# Patient Record
Sex: FEMALE | Race: WHITE | NOT HISPANIC OR LATINO
[De-identification: names, ages, dates, MRNs, and addresses within clinical notes are randomized per-mention and may not be internally consistent; named-entity substitution may affect disease eponyms.]

---

## 2024-08-07 PROBLEM — Z78.9 NEVER SMOKED TOBACCO: Status: ACTIVE | Noted: 2024-08-07

## 2024-08-07 PROBLEM — Z78.9 NEVER SMOKED CIGARETTES: Status: ACTIVE | Noted: 2024-08-07

## 2024-08-07 PROBLEM — Z86.39 HISTORY OF HYPOTHYROIDISM: Status: RESOLVED | Noted: 2024-08-07 | Resolved: 2024-08-07

## 2024-08-07 PROBLEM — D32.9 MENINGIOMA: Status: ACTIVE | Noted: 2024-08-07

## 2024-08-07 PROBLEM — R93.89 ABNORMAL RADIOGRAPHIC EXAMINATION: Status: ACTIVE | Noted: 2024-08-07

## 2024-08-07 PROBLEM — H54.62 VISION LOSS, LEFT EYE: Status: ACTIVE | Noted: 2024-08-07

## 2024-08-07 PROBLEM — Z00.00 ENCOUNTER FOR PREVENTIVE HEALTH EXAMINATION: Status: ACTIVE | Noted: 2024-08-07

## 2024-08-07 PROBLEM — Z78.9 NEVER SMOKED ANY SUBSTANCE: Status: ACTIVE | Noted: 2024-08-07

## 2024-08-08 ENCOUNTER — APPOINTMENT (OUTPATIENT)
Dept: NEUROSURGERY | Facility: CLINIC | Age: 74
End: 2024-08-08

## 2024-08-08 PROCEDURE — 99203 OFFICE O/P NEW LOW 30 MIN: CPT

## 2024-08-08 NOTE — PHYSICAL EXAM
[General Appearance - Alert] : alert [General Appearance - In No Acute Distress] : in no acute distress [General Appearance - Well Nourished] : well nourished [General Appearance - Well Developed] : well developed [General Appearance - Well-Appearing] : healthy appearing [] : normal voice and communication [Oriented To Time, Place, And Person] : oriented to person, place, and time [Affect] : the affect was normal [Mood] : the mood was normal [Memory Recent] : recent memory was not impaired [Memory Remote] : remote memory was not impaired [Person] : oriented to person [Place] : oriented to place [Time] : oriented to time [Short Term Intact] : short term memory intact [Remote Intact] : remote memory intact [Registration Intact] : recent registration memory intact [Span Intact] : the attention span was normal [Concentration Intact] : normal concentrating ability [Fluency] : fluency intact [Comprehension] : comprehension intact [Reading] : reading intact [Current Events] : adequate knowledge of current events [Past History] : adequate knowledge of personal past history [Vocabulary] : adequate range of vocabulary [Cranial Nerves Oculomotor (III)] : extraocular motion intact [Cranial Nerves Trigeminal (V)] : facial sensation intact symmetrically [Cranial Nerves Facial (VII)] : face symmetrical [Cranial Nerves Vestibulocochlear (VIII)] : hearing was intact bilaterally [Cranial Nerves Glossopharyngeal (IX)] : tongue and palate midline [Cranial Nerves Accessory (XI - Cranial And Spinal)] : head turning and shoulder shrug symmetric [Cranial Nerves Hypoglossal (XII)] : there was no tongue deviation with protrusion [Motor Tone] : muscle tone was normal in all four extremities [Motor Strength] : muscle strength was normal in all four extremities [Involuntary Movements] : no involuntary movements were seen [No Muscle Atrophy] : normal bulk in all four extremities [Motor Handedness Right-Handed] : the patient is right hand dominant [5] : S1 toe walking 5/5 [Sensation Tactile Decrease] : light touch was intact [Abnormal Walk] : normal gait [Balance] : balance was intact [PERRL With Normal Accommodation] : pupils were equal in size, round, reactive to light, with normal accommodation [Extraocular Movements] : extraocular movements were intact [Visual Intact] : visual attention was ~T ~L decreased [Limited Balance] : balance was intact [Past-pointing] : there was no past-pointing [Tremor] : no tremor present [Dysdiadochokinesia Bilaterally] : not present [Coordination - Dysmetria Impaired Finger-to-Nose Bilateral] : not present [FreeTextEntry5] : decreased left central field vision [FreeTextEntry1] : visual field full bilat but decreased vision left central vision

## 2024-08-08 NOTE — ASSESSMENT
[FreeTextEntry1] : I, Dr. Connell, personally performed the evaluation and management (E/M) services for this new patient who presents today with a new problem. That E/M includes conducting the examination, assessing all new/exacerbated conditions, and establishing a new plan of care. Today, my ACP, Shy Castro, was here to observe my evaluation and management services for this new problem/exacerbated condition to be followed going forward.  PLAN: - PST provided, CHG provided both instructions reviewed. Would need Cardiology clearance - Angiogram pre-op day before recommended prior to surgery to understand venous anatomy possible need for embolization - Surgical resection recommended and all questions answered - Ok to stay on Aspirin up until surgery  PCP; Dr. Lizzie Perry, [facility] in [place] [phone] Cardiologist: Dr. Meño Marin [phone]

## 2024-08-08 NOTE — HISTORY OF PRESENT ILLNESS
[< 3 months] : less than 3 months [de-identified] : The patient is a 73 year female.  On 7/21 pt was playing pickleball and noticed a significant decrease central vision to her left eye. 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 She went to her local ED in [place] and had a full stroke w/u/ That work-up revealed a large right occipital meningioma. She had a cardiac w/u including ALEKS, loops recording, EKG, ESD, d-dimer labs all WNL. She underwent a temporal artery biopsy which was negative. She is on asa and lipitor now. She presents today to have the meningioma reviewed and discussed.

## 2024-08-08 NOTE — ASSESSMENT
[FreeTextEntry1] : I, Dr. Connell, personally performed the evaluation and management (E/M) services for this new patient who presents today with a new problem. That E/M includes conducting the examination, assessing all new/exacerbated conditions, and establishing a new plan of care. Today, my ACP, Shy Castro, was here to observe my evaluation and management services for this new problem/exacerbated condition to be followed going forward.  PLAN: - PST provided, CHG provided both instructions reviewed. Would need Cardiology clearance - Angiogram pre-op day before recommended prior to surgery to understand venous anatomy possible need for embolization - Surgical resection recommended and all questions answered - Ok to stay on Aspirin up until surgery  PCP; Dr. Lizzie Perry, [facility] in [place] [phone] Cardiologist: Dr. Meño Marin [phone]

## 2024-08-08 NOTE — HISTORY OF PRESENT ILLNESS
[< 3 months] : less than 3 months [de-identified] : The patient is a 73 year female.  On 7/21 pt was playing pickleball and noticed a significant decrease central vision to her left eye. 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 She went to her local ED in [place] and had a full stroke w/u/ That work-up revealed a large right occipital meningioma. She had a cardiac w/u including ALEKS, loops recording, EKG, ESD, d-dimer labs all WNL. She underwent a temporal artery biopsy which was negative. She is on asa and lipitor now. She presents today to have the meningioma reviewed and discussed.

## 2024-08-08 NOTE — PHYSICAL EXAM
[General Appearance - In No Acute Distress] : in no acute distress [General Appearance - Alert] : alert [General Appearance - Well Nourished] : well nourished [General Appearance - Well Developed] : well developed [General Appearance - Well-Appearing] : healthy appearing [] : normal voice and communication [Oriented To Time, Place, And Person] : oriented to person, place, and time [Affect] : the affect was normal [Mood] : the mood was normal [Memory Recent] : recent memory was not impaired [Memory Remote] : remote memory was not impaired [Person] : oriented to person [Place] : oriented to place [Time] : oriented to time [Short Term Intact] : short term memory intact [Remote Intact] : remote memory intact [Registration Intact] : recent registration memory intact [Span Intact] : the attention span was normal [Concentration Intact] : normal concentrating ability [Fluency] : fluency intact [Comprehension] : comprehension intact [Reading] : reading intact [Current Events] : adequate knowledge of current events [Past History] : adequate knowledge of personal past history [Vocabulary] : adequate range of vocabulary [Cranial Nerves Oculomotor (III)] : extraocular motion intact [Cranial Nerves Trigeminal (V)] : facial sensation intact symmetrically [Cranial Nerves Facial (VII)] : face symmetrical [Cranial Nerves Glossopharyngeal (IX)] : tongue and palate midline [Cranial Nerves Vestibulocochlear (VIII)] : hearing was intact bilaterally [Cranial Nerves Accessory (XI - Cranial And Spinal)] : head turning and shoulder shrug symmetric [Cranial Nerves Hypoglossal (XII)] : there was no tongue deviation with protrusion [Motor Tone] : muscle tone was normal in all four extremities [Motor Strength] : muscle strength was normal in all four extremities [Involuntary Movements] : no involuntary movements were seen [No Muscle Atrophy] : normal bulk in all four extremities [Motor Handedness Right-Handed] : the patient is right hand dominant [5] : S1 toe walking 5/5 [Sensation Tactile Decrease] : light touch was intact [Abnormal Walk] : normal gait [Balance] : balance was intact [PERRL With Normal Accommodation] : pupils were equal in size, round, reactive to light, with normal accommodation [Extraocular Movements] : extraocular movements were intact [Visual Intact] : visual attention was ~T ~L decreased [Limited Balance] : balance was intact [Past-pointing] : there was no past-pointing [Tremor] : no tremor present [Dysdiadochokinesia Bilaterally] : not present [Coordination - Dysmetria Impaired Finger-to-Nose Bilateral] : not present [FreeTextEntry5] : decreased left central field vision [FreeTextEntry1] : visual field full bilat but decreased vision left central vision

## 2024-08-13 ENCOUNTER — TRANSCRIPTION ENCOUNTER (OUTPATIENT)
Age: 74
End: 2024-08-13